# Patient Record
(demographics unavailable — no encounter records)

---

## 2025-04-17 NOTE — IMAGING
[de-identified] : RIGHT ELBOW Inspection: No Swelling, No erythema. Defect over the triceps tendon distally. Palpation: No Tenderness  Range of Motion: Full elbow flexion and elbow extension.  Strength: flexion strength 5/5, extension strength 4/5, pronation strength 5/5 and supination  strength 5/5  Neurological testing: motor exam 5/5 and light touch intact.  Ligament Stability and Special Testing: No varus or valgus instability

## 2025-04-17 NOTE — HISTORY OF PRESENT ILLNESS
[de-identified] : 04/16/2025: The patient is a 50 year old M, right hand dominant who presents today complaining of right elbow. Date of Injury/Onset: 4/2025 Pain:    At Rest: 3/10 With Activity:  7/10 Mechanism of injury: pt was at the gym and felt a pop in his elbow  This is not a Work Related Injury being treated under Worker's Compensation. This is not an athletic injury occurring associated with an interscholastic or organized sports team. 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

## 2025-04-17 NOTE — IMAGING
[de-identified] : RIGHT ELBOW Inspection: No Swelling, No erythema. Defect over the triceps tendon distally. Palpation: No Tenderness  Range of Motion: Full elbow flexion and elbow extension.  Strength: flexion strength 5/5, extension strength 4/5, pronation strength 5/5 and supination  strength 5/5  Neurological testing: motor exam 5/5 and light touch intact.  Ligament Stability and Special Testing: No varus or valgus instability

## 2025-04-17 NOTE — DISCUSSION/SUMMARY
[de-identified] :  Right X-Ray Examination of the ELBOW 3 views: no fractures, subluxations or dislocations.     Assessment:   The patient is a 50 year old male with physical exam findings consistent with RIGHT ELBOW TRICEPS TENDON TEAR     - Patient was at the gym lifting and felt a pop in his elbow.  - We discussed their diagnosis and treatment options at length including surgical and non-surgical treatment options at length. - We discussed icing, and anti-inflammatory medication. - Advised patient to modify their activities. - Right elbow STAT MRI Referral eval for triceps tendon tear. - Informed patient of importance of timely repair of the triceps tendon. Patient expressed understanding and all questions were answered.   Follow up: with MRI

## 2025-04-17 NOTE — DISCUSSION/SUMMARY
[de-identified] :  Right X-Ray Examination of the ELBOW 3 views: no fractures, subluxations or dislocations.     Assessment:   The patient is a 50 year old male with physical exam findings consistent with RIGHT ELBOW TRICEPS TENDON TEAR     - Patient was at the gym lifting and felt a pop in his elbow.  - We discussed their diagnosis and treatment options at length including surgical and non-surgical treatment options at length. - We discussed icing, and anti-inflammatory medication. - Advised patient to modify their activities. - Right elbow STAT MRI Referral eval for triceps tendon tear. - Informed patient of importance of timely repair of the triceps tendon. Patient expressed understanding and all questions were answered.   Follow up: with MRI

## 2025-04-17 NOTE — HISTORY OF PRESENT ILLNESS
[de-identified] : 04/16/2025: The patient is a 50 year old M, right hand dominant who presents today complaining of right elbow. Date of Injury/Onset: 4/2025 Pain:    At Rest: 3/10 With Activity:  7/10 Mechanism of injury: pt was at the gym and felt a pop in his elbow  This is not a Work Related Injury being treated under Worker's Compensation. This is not an athletic injury occurring associated with an interscholastic or organized sports team. 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

## 2025-05-12 NOTE — HISTORY OF PRESENT ILLNESS
[de-identified] : 05/07/2025: Patient present today for follow-up right elbow pain. They underwent an MRI study and are here to discuss the imaging results. Since last visit, pt has been feeling about the same.  04/16/2025: The patient is a 50 year old M, right hand dominant who presents today complaining of right elbow. Date of Injury/Onset: 4/2025 Pain:    At Rest: 3/10 With Activity:  7/10 Mechanism of injury: pt was at the gym and felt a pop in his elbow  This is not a Work Related Injury being treated under Worker's Compensation. This is not an athletic injury occurring associated with an interscholastic or organized sports team. 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

## 2025-05-12 NOTE — DISCUSSION/SUMMARY
[de-identified] : Assessment:   The patient is a 50 year old male with physical exam findings consistent with RIGHT ELBOW TRICEPS TENDONITIS, and LEC   - We discussed their diagnosis and treatment options at length including surgical and non-surgical treatment options at length. -  We will continue conservative treatment with PT (rx provided), icing, and anti-inflammatory medications. - Advised patient to modify their activities as pain allows. - The risks, benefits, and alternatives to corticosteroid injections were reviewed with the patient. Risks outlined include but are not limited to infection, sepsis, bleeding, scarring, skin discoloration, temporary increase in pain, syncopal episode, failure to resolve symptoms, symptoms recurrence, allergic reaction, flare reaction, and elevation of blood sugar in diabetics. Patient understood the risks and asked to proceed with this treatment course. Cindy well   Follow up: 3 months or sooner as needed

## 2025-05-12 NOTE — DATA REVIEWED
[MRI] : MRI [Right] : of the right [Elbow] : elbow [Report was reviewed and noted in the chart] : The report was reviewed and noted in the chart [I independently reviewed and interpreted images and report] : I independently reviewed and interpreted images and report [I reviewed the films/CD] : I reviewed the films/CD [FreeTextEntry1] : [facility] MRI 04/28/25 Right Elbow:  1. Common extensor tendinopathy and fraying with ill-defined low-grade tear at the humeral origin. Lateral collateral ligament fraying at the humeral origin. 2. Triceps insertional tendinopathy with soft tissue edema. 3. Thickened lateral and superior synovial fold with joint space narrowing and effusion. This is nonspecific and can be seen with impingement in the right clinical setting.

## 2025-05-12 NOTE — PROCEDURE
[FreeTextEntry3] : Medium joint injection was performed of the RIGHT elbow joint. The indication for this procedure was pain and inflammation. The site was prepped with alcohol, ethyl chloride sprayed topically and sterile technique used. An injection of  Betamethasone (Celestone) 0.5cc of 3mg, Lidocaine 1cc of 1% , Bupivacaine (Marcaine) 1cc of 0.25%  was used.  Patient was advised to call if redness, pain or fever occur, apply ice for 15 minutes out of every hour for the next 12-24 hours as tolerated and patient was advised to rest the joint(s) for 1 days.    The risks benefits, and alternatives have been discussed, and verbal consent was obtained. [Tendon Origin] : tendon origin [Right] : of the right [Lateral Epicondyle] : lateral epicondyle [Pain] : pain [Inflammation] : inflammation [Alcohol] : alcohol [Ethyl Chloride sprayed topically] : ethyl chloride sprayed topically [Sterile technique used] : sterile technique used [___ cc    6mg] :  Betamethasone (Celestone) ~Vcc of 6mg [___ cc    1%] : Lidocaine ~Vcc of 1%  [___ cc    0.25%] : Bupivacaine (Marcaine) ~Vcc of 0.25%  [] : Patient tolerated procedure well [Call if redness, pain or fever occur] : call if redness, pain or fever occur [Apply ice for 15min out of every hour for the next 12-24 hours as tolerated] : apply ice for 15 minutes out of every hour for the next 12-24 hours as tolerated [Risks, benefits, alternatives discussed / Verbal consent obtained] : the risks benefits, and alternatives have been discussed, and verbal consent was obtained

## 2025-05-12 NOTE — IMAGING
[de-identified] : RIGHT ELBOW Inspection: Swelling Palpation: Tenderness over lateral epicondyle.  Range of Motion: Full elbow flexion and elbow extension.  Strength: flexion strength 5/5, extension strength 5/5, pronation strength 5/5 and supination  strength 5/5  Neurological testing: motor exam 5/5 and light touch intact.  Ligament Stability and Special Testing:  Lateral elbow pain with resisted wrist extension